# Patient Record
Sex: FEMALE | URBAN - METROPOLITAN AREA
[De-identification: names, ages, dates, MRNs, and addresses within clinical notes are randomized per-mention and may not be internally consistent; named-entity substitution may affect disease eponyms.]

---

## 2020-12-11 ENCOUNTER — APPOINTMENT (RX ONLY)
Dept: URBAN - METROPOLITAN AREA CLINIC 38 | Facility: CLINIC | Age: 55
Setting detail: DERMATOLOGY
End: 2020-12-11

## 2020-12-11 ENCOUNTER — APPOINTMENT (RX ONLY)
Dept: URBAN - METROPOLITAN AREA CLINIC 37 | Facility: CLINIC | Age: 55
Setting detail: DERMATOLOGY
End: 2020-12-11

## 2020-12-11 DIAGNOSIS — L80 VITILIGO: ICD-10-CM

## 2020-12-11 PROCEDURE — ? PRESCRIPTION

## 2020-12-11 PROCEDURE — 99202 OFFICE O/P NEW SF 15 MIN: CPT

## 2020-12-11 PROCEDURE — ? COUNSELING

## 2020-12-11 RX ORDER — BETAMETHASONE DIPROPIONATE 0.5 MG/G
OINTMENT TOPICAL BID
Qty: 45 | Refills: 0 | Status: ERX | COMMUNITY
Start: 2020-12-11

## 2020-12-11 RX ADMIN — BETAMETHASONE DIPROPIONATE THIN LAYER: 0.5 OINTMENT TOPICAL at 00:00

## 2020-12-11 ASSESSMENT — LOCATION ZONE DERM
LOCATION ZONE: ARM
LOCATION ZONE: FINGER
LOCATION ZONE: HAND
LOCATION ZONE: FINGER
LOCATION ZONE: HAND
LOCATION ZONE: NOSE
LOCATION ZONE: LIP
LOCATION ZONE: LIP
LOCATION ZONE: ARM
LOCATION ZONE: NOSE

## 2020-12-11 ASSESSMENT — LOCATION SIMPLE DESCRIPTION DERM
LOCATION SIMPLE: LEFT THUMB
LOCATION SIMPLE: LEFT THUMB
LOCATION SIMPLE: LEFT INDEX FINGER
LOCATION SIMPLE: RIGHT RING FINGER
LOCATION SIMPLE: RIGHT SMALL FINGER
LOCATION SIMPLE: NOSE
LOCATION SIMPLE: RIGHT MIDDLE FINGER
LOCATION SIMPLE: RIGHT RING FINGER
LOCATION SIMPLE: RIGHT ELBOW
LOCATION SIMPLE: LEFT INDEX FINGER
LOCATION SIMPLE: LEFT SMALL FINGER
LOCATION SIMPLE: LEFT SMALL FINGER
LOCATION SIMPLE: RIGHT HAND
LOCATION SIMPLE: RIGHT THUMB
LOCATION SIMPLE: LEFT MIDDLE FINGER
LOCATION SIMPLE: LEFT ELBOW
LOCATION SIMPLE: RIGHT SMALL FINGER
LOCATION SIMPLE: LEFT HAND
LOCATION SIMPLE: RIGHT THUMB
LOCATION SIMPLE: RIGHT MIDDLE FINGER
LOCATION SIMPLE: RIGHT LIP
LOCATION SIMPLE: NOSE
LOCATION SIMPLE: RIGHT HAND
LOCATION SIMPLE: LEFT HAND
LOCATION SIMPLE: LEFT MIDDLE FINGER
LOCATION SIMPLE: RIGHT LIP
LOCATION SIMPLE: LEFT ELBOW
LOCATION SIMPLE: LEFT RING FINGER
LOCATION SIMPLE: LEFT RING FINGER
LOCATION SIMPLE: RIGHT ELBOW

## 2020-12-11 ASSESSMENT — LOCATION DETAILED DESCRIPTION DERM
LOCATION DETAILED: LEFT ELBOW
LOCATION DETAILED: LEFT MID DORSAL SMALL FINGER
LOCATION DETAILED: LEFT MID DORSAL SMALL FINGER
LOCATION DETAILED: DORSAL INTERPHALANGEAL JOINT RIGHT THUMB
LOCATION DETAILED: LEFT ELBOW
LOCATION DETAILED: NASAL SUPRATIP
LOCATION DETAILED: LEFT ULNAR DORSAL HAND
LOCATION DETAILED: DORSAL INTERPHALANGEAL JOINT RIGHT THUMB
LOCATION DETAILED: LEFT PROXIMAL DORSAL INDEX FINGER
LOCATION DETAILED: LEFT ULNAR DORSAL HAND
LOCATION DETAILED: RIGHT SMALL FINGER PROXIMAL INTERPHALANGEAL JOINT
LOCATION DETAILED: LEFT PROXIMAL DORSAL MIDDLE FINGER
LOCATION DETAILED: LEFT PROXIMAL DORSAL MIDDLE FINGER
LOCATION DETAILED: RIGHT RADIAL DORSAL HAND
LOCATION DETAILED: NASAL SUPRATIP
LOCATION DETAILED: RIGHT LOWER CUTANEOUS LIP
LOCATION DETAILED: RIGHT ELBOW
LOCATION DETAILED: RIGHT SMALL FINGER PROXIMAL INTERPHALANGEAL JOINT
LOCATION DETAILED: RIGHT RING FINGER PROXIMAL INTERPHALANGEAL JOINT
LOCATION DETAILED: LEFT PROXIMAL DORSAL THUMB
LOCATION DETAILED: LEFT PROXIMAL DORSAL INDEX FINGER
LOCATION DETAILED: RIGHT RADIAL DORSAL HAND
LOCATION DETAILED: LEFT PROXIMAL DORSAL THUMB
LOCATION DETAILED: RIGHT LOWER CUTANEOUS LIP
LOCATION DETAILED: RIGHT PROXIMAL DORSAL MIDDLE FINGER
LOCATION DETAILED: LEFT RING FINGER PROXIMAL INTERPHALANGEAL JOINT
LOCATION DETAILED: RIGHT ELBOW
LOCATION DETAILED: LEFT RING FINGER PROXIMAL INTERPHALANGEAL JOINT
LOCATION DETAILED: RIGHT PROXIMAL DORSAL MIDDLE FINGER
LOCATION DETAILED: RIGHT RING FINGER PROXIMAL INTERPHALANGEAL JOINT

## 2020-12-11 NOTE — PROCEDURE: COUNSELING
Patient Specific Counseling (Will Not Stick From Patient To Patient): She has had the vitiligo for years - the facial discoloration is the most recent and is less than a year.  We discussed that the longer the depigmentation has been present, the more difficult it will be to re-pigment.  Will start with potent topical steroid, QHS, to all affected areas until her return 12/23/20.  Briefly discuss nUVB - face most likely area to respond and using protopic and sun-light.  No personal or FHx of thyroid problems or alopecia areata.
Detail Level: Zone

## 2020-12-11 NOTE — PROCEDURE: COUNSELING
Detail Level: Zone
Patient Specific Counseling (Will Not Stick From Patient To Patient): She has had the vitiligo for years - the facial discoloration is the most recent and is less than a year.  We discussed that the longer the depigmentation has been present, the more difficult it will be to re-pigment.  Will start with potent topical steroid, QHS, to all affected areas until her return 12/23/20.  Briefly discuss nUVB - face most likely area to respond and using protopic and sun-light.  No personal or FHx of thyroid problems or alopecia areata.

## 2020-12-11 NOTE — PROCEDURE: MIPS QUALITY
Additional Notes: Patient has not received 20-21 flu vaccine
Quality 110: Preventive Care And Screening: Influenza Immunization: Influenza Immunization not Administered for Documented Reasons.
Detail Level: Detailed

## 2020-12-23 ENCOUNTER — APPOINTMENT (RX ONLY)
Dept: URBAN - METROPOLITAN AREA CLINIC 37 | Facility: CLINIC | Age: 55
Setting detail: DERMATOLOGY
End: 2020-12-23

## 2020-12-23 ENCOUNTER — APPOINTMENT (RX ONLY)
Dept: URBAN - METROPOLITAN AREA CLINIC 38 | Facility: CLINIC | Age: 55
Setting detail: DERMATOLOGY
End: 2020-12-23

## 2020-12-23 DIAGNOSIS — L80 VITILIGO: ICD-10-CM

## 2020-12-23 PROCEDURE — ? COUNSELING

## 2020-12-23 PROCEDURE — ? PRESCRIPTION

## 2020-12-23 PROCEDURE — 99213 OFFICE O/P EST LOW 20 MIN: CPT

## 2020-12-23 PROCEDURE — ? ADDITIONAL NOTES

## 2020-12-23 RX ORDER — TACROLIMUS 1 MG/G
THIN LAYER OINTMENT TOPICAL BID
Qty: 1 | Refills: 1 | Status: ERX | COMMUNITY
Start: 2020-12-23

## 2020-12-23 RX ADMIN — TACROLIMUS THIN LAYER: 1 OINTMENT TOPICAL at 00:00

## 2020-12-23 ASSESSMENT — LOCATION SIMPLE DESCRIPTION DERM
LOCATION SIMPLE: LEFT ELBOW
LOCATION SIMPLE: RIGHT HAND
LOCATION SIMPLE: RIGHT ELBOW
LOCATION SIMPLE: LEFT THUMB
LOCATION SIMPLE: LEFT RING FINGER
LOCATION SIMPLE: RIGHT RING FINGER
LOCATION SIMPLE: LEFT SMALL FINGER
LOCATION SIMPLE: RIGHT SMALL FINGER
LOCATION SIMPLE: RIGHT THUMB
LOCATION SIMPLE: RIGHT MIDDLE FINGER
LOCATION SIMPLE: RIGHT HAND
LOCATION SIMPLE: LEFT MIDDLE FINGER
LOCATION SIMPLE: LEFT THUMB
LOCATION SIMPLE: LEFT INDEX FINGER
LOCATION SIMPLE: RIGHT LIP
LOCATION SIMPLE: RIGHT MIDDLE FINGER
LOCATION SIMPLE: RIGHT SMALL FINGER
LOCATION SIMPLE: RIGHT ELBOW
LOCATION SIMPLE: RIGHT RING FINGER
LOCATION SIMPLE: LEFT INDEX FINGER
LOCATION SIMPLE: NOSE
LOCATION SIMPLE: LEFT RING FINGER
LOCATION SIMPLE: LEFT MIDDLE FINGER
LOCATION SIMPLE: RIGHT THUMB
LOCATION SIMPLE: NOSE
LOCATION SIMPLE: LEFT HAND
LOCATION SIMPLE: RIGHT LIP
LOCATION SIMPLE: LEFT ELBOW
LOCATION SIMPLE: LEFT HAND
LOCATION SIMPLE: LEFT SMALL FINGER

## 2020-12-23 ASSESSMENT — LOCATION DETAILED DESCRIPTION DERM
LOCATION DETAILED: LEFT PROXIMAL DORSAL MIDDLE FINGER
LOCATION DETAILED: RIGHT ELBOW
LOCATION DETAILED: LEFT ELBOW
LOCATION DETAILED: LEFT ULNAR DORSAL HAND
LOCATION DETAILED: LEFT ELBOW
LOCATION DETAILED: LEFT RING FINGER PROXIMAL INTERPHALANGEAL JOINT
LOCATION DETAILED: RIGHT LOWER CUTANEOUS LIP
LOCATION DETAILED: RIGHT SMALL FINGER PROXIMAL INTERPHALANGEAL JOINT
LOCATION DETAILED: RIGHT LOWER CUTANEOUS LIP
LOCATION DETAILED: RIGHT PROXIMAL DORSAL MIDDLE FINGER
LOCATION DETAILED: DORSAL INTERPHALANGEAL JOINT RIGHT THUMB
LOCATION DETAILED: LEFT PROXIMAL DORSAL MIDDLE FINGER
LOCATION DETAILED: LEFT RING FINGER PROXIMAL INTERPHALANGEAL JOINT
LOCATION DETAILED: DORSAL INTERPHALANGEAL JOINT RIGHT THUMB
LOCATION DETAILED: RIGHT SMALL FINGER PROXIMAL INTERPHALANGEAL JOINT
LOCATION DETAILED: LEFT MID DORSAL SMALL FINGER
LOCATION DETAILED: LEFT PROXIMAL DORSAL THUMB
LOCATION DETAILED: RIGHT RADIAL DORSAL HAND
LOCATION DETAILED: LEFT PROXIMAL DORSAL INDEX FINGER
LOCATION DETAILED: RIGHT ELBOW
LOCATION DETAILED: NASAL SUPRATIP
LOCATION DETAILED: RIGHT RADIAL DORSAL HAND
LOCATION DETAILED: LEFT PROXIMAL DORSAL INDEX FINGER
LOCATION DETAILED: LEFT PROXIMAL DORSAL THUMB
LOCATION DETAILED: LEFT ULNAR DORSAL HAND
LOCATION DETAILED: NASAL SUPRATIP
LOCATION DETAILED: RIGHT RING FINGER PROXIMAL INTERPHALANGEAL JOINT
LOCATION DETAILED: LEFT MID DORSAL SMALL FINGER
LOCATION DETAILED: RIGHT RING FINGER PROXIMAL INTERPHALANGEAL JOINT
LOCATION DETAILED: RIGHT PROXIMAL DORSAL MIDDLE FINGER

## 2020-12-23 ASSESSMENT — LOCATION ZONE DERM
LOCATION ZONE: LIP
LOCATION ZONE: NOSE
LOCATION ZONE: HAND
LOCATION ZONE: HAND
LOCATION ZONE: ARM
LOCATION ZONE: LIP
LOCATION ZONE: NOSE
LOCATION ZONE: ARM
LOCATION ZONE: FINGER
LOCATION ZONE: FINGER

## 2020-12-23 NOTE — PROCEDURE: COUNSELING
Detail Level: Zone
Patient Specific Counseling (Will Not Stick From Patient To Patient): She has had the vitiligo for years - the facial discoloration is the most recent and is less than a year.  We discussed that the longer the depigmentation has been present, the more difficult it will be to re-pigment.  She has completed two weeks of potent topical steroid, QHS, to all affected areas.  Discussed transition to protopic ointment, bid, and to try and get some sun-light to encourage melanogenesis.  Filled out paperwork for nUVB - face mostly as it is the most recent area affected (few months).